# Patient Record
Sex: FEMALE | Race: BLACK OR AFRICAN AMERICAN | NOT HISPANIC OR LATINO | URBAN - METROPOLITAN AREA
[De-identification: names, ages, dates, MRNs, and addresses within clinical notes are randomized per-mention and may not be internally consistent; named-entity substitution may affect disease eponyms.]

---

## 2019-06-14 ENCOUNTER — EMERGENCY (EMERGENCY)
Facility: HOSPITAL | Age: 39
LOS: 1 days | Discharge: HOME | End: 2019-06-14
Attending: EMERGENCY MEDICINE | Admitting: EMERGENCY MEDICINE
Payer: COMMERCIAL

## 2019-06-14 VITALS
OXYGEN SATURATION: 97 % | DIASTOLIC BLOOD PRESSURE: 74 MMHG | SYSTOLIC BLOOD PRESSURE: 114 MMHG | WEIGHT: 160.06 LBS | HEART RATE: 89 BPM | RESPIRATION RATE: 18 BRPM | TEMPERATURE: 99 F

## 2019-06-14 DIAGNOSIS — R41.82 ALTERED MENTAL STATUS, UNSPECIFIED: ICD-10-CM

## 2019-06-14 DIAGNOSIS — R55 SYNCOPE AND COLLAPSE: ICD-10-CM

## 2019-06-14 DIAGNOSIS — F10.129 ALCOHOL ABUSE WITH INTOXICATION, UNSPECIFIED: ICD-10-CM

## 2019-06-14 LAB
ALBUMIN SERPL ELPH-MCNC: 4 G/DL — SIGNIFICANT CHANGE UP (ref 3.3–5)
ALP SERPL-CCNC: 78 U/L — SIGNIFICANT CHANGE UP (ref 40–120)
ALT FLD-CCNC: 11 U/L — SIGNIFICANT CHANGE UP (ref 10–45)
ANION GAP SERPL CALC-SCNC: 14 MMOL/L — SIGNIFICANT CHANGE UP (ref 5–17)
AST SERPL-CCNC: 16 U/L — SIGNIFICANT CHANGE UP (ref 10–40)
BASOPHILS # BLD AUTO: 0.01 K/UL — SIGNIFICANT CHANGE UP (ref 0–0.2)
BASOPHILS NFR BLD AUTO: 0.2 % — SIGNIFICANT CHANGE UP (ref 0–2)
BILIRUB SERPL-MCNC: <0.2 MG/DL — SIGNIFICANT CHANGE UP (ref 0.2–1.2)
BUN SERPL-MCNC: 12 MG/DL — SIGNIFICANT CHANGE UP (ref 7–23)
CALCIUM SERPL-MCNC: 8.8 MG/DL — SIGNIFICANT CHANGE UP (ref 8.4–10.5)
CHLORIDE SERPL-SCNC: 110 MMOL/L — HIGH (ref 96–108)
CO2 SERPL-SCNC: 20 MMOL/L — LOW (ref 22–31)
CREAT SERPL-MCNC: 0.83 MG/DL — SIGNIFICANT CHANGE UP (ref 0.5–1.3)
EOSINOPHIL # BLD AUTO: 0.09 K/UL — SIGNIFICANT CHANGE UP (ref 0–0.5)
EOSINOPHIL NFR BLD AUTO: 1.5 % — SIGNIFICANT CHANGE UP (ref 0–6)
ETHANOL SERPL-MCNC: 146 MG/DL — HIGH (ref 0–10)
GLUCOSE SERPL-MCNC: 133 MG/DL — HIGH (ref 70–99)
HCG SERPL-ACNC: <0 MIU/ML — SIGNIFICANT CHANGE UP
HCT VFR BLD CALC: 31.4 % — LOW (ref 34.5–45)
HGB BLD-MCNC: 10.1 G/DL — LOW (ref 11.5–15.5)
IMM GRANULOCYTES NFR BLD AUTO: 0.3 % — SIGNIFICANT CHANGE UP (ref 0–1.5)
LIDOCAIN IGE QN: 35 U/L — SIGNIFICANT CHANGE UP (ref 7–60)
LYMPHOCYTES # BLD AUTO: 2.49 K/UL — SIGNIFICANT CHANGE UP (ref 1–3.3)
LYMPHOCYTES # BLD AUTO: 40.3 % — SIGNIFICANT CHANGE UP (ref 13–44)
MCHC RBC-ENTMCNC: 30 PG — SIGNIFICANT CHANGE UP (ref 27–34)
MCHC RBC-ENTMCNC: 32.2 GM/DL — SIGNIFICANT CHANGE UP (ref 32–36)
MCV RBC AUTO: 93.2 FL — SIGNIFICANT CHANGE UP (ref 80–100)
MONOCYTES # BLD AUTO: 0.43 K/UL — SIGNIFICANT CHANGE UP (ref 0–0.9)
MONOCYTES NFR BLD AUTO: 7 % — SIGNIFICANT CHANGE UP (ref 2–14)
NEUTROPHILS # BLD AUTO: 3.14 K/UL — SIGNIFICANT CHANGE UP (ref 1.8–7.4)
NEUTROPHILS NFR BLD AUTO: 50.7 % — SIGNIFICANT CHANGE UP (ref 43–77)
NRBC # BLD: 0 /100 WBCS — SIGNIFICANT CHANGE UP (ref 0–0)
PCP SPEC-MCNC: SIGNIFICANT CHANGE UP
PLATELET # BLD AUTO: 344 K/UL — SIGNIFICANT CHANGE UP (ref 150–400)
POTASSIUM SERPL-MCNC: 3.3 MMOL/L — LOW (ref 3.5–5.3)
POTASSIUM SERPL-SCNC: 3.3 MMOL/L — LOW (ref 3.5–5.3)
PROT SERPL-MCNC: 6.9 G/DL — SIGNIFICANT CHANGE UP (ref 6–8.3)
RBC # BLD: 3.37 M/UL — LOW (ref 3.8–5.2)
RBC # FLD: 14.2 % — SIGNIFICANT CHANGE UP (ref 10.3–14.5)
SODIUM SERPL-SCNC: 144 MMOL/L — SIGNIFICANT CHANGE UP (ref 135–145)
WBC # BLD: 6.18 K/UL — SIGNIFICANT CHANGE UP (ref 3.8–10.5)
WBC # FLD AUTO: 6.18 K/UL — SIGNIFICANT CHANGE UP (ref 3.8–10.5)

## 2019-06-14 PROCEDURE — 85025 COMPLETE CBC W/AUTO DIFF WBC: CPT

## 2019-06-14 PROCEDURE — 83690 ASSAY OF LIPASE: CPT

## 2019-06-14 PROCEDURE — 99285 EMERGENCY DEPT VISIT HI MDM: CPT | Mod: 25

## 2019-06-14 PROCEDURE — 84702 CHORIONIC GONADOTROPIN TEST: CPT

## 2019-06-14 PROCEDURE — 70450 CT HEAD/BRAIN W/O DYE: CPT

## 2019-06-14 PROCEDURE — 80307 DRUG TEST PRSMV CHEM ANLYZR: CPT

## 2019-06-14 PROCEDURE — 70450 CT HEAD/BRAIN W/O DYE: CPT | Mod: 26

## 2019-06-14 PROCEDURE — 36415 COLL VENOUS BLD VENIPUNCTURE: CPT

## 2019-06-14 PROCEDURE — 80053 COMPREHEN METABOLIC PANEL: CPT

## 2019-06-14 PROCEDURE — 99285 EMERGENCY DEPT VISIT HI MDM: CPT

## 2019-06-14 PROCEDURE — 96374 THER/PROPH/DIAG INJ IV PUSH: CPT

## 2019-06-14 PROCEDURE — 96375 TX/PRO/DX INJ NEW DRUG ADDON: CPT

## 2019-06-14 PROCEDURE — 96361 HYDRATE IV INFUSION ADD-ON: CPT

## 2019-06-14 PROCEDURE — 82962 GLUCOSE BLOOD TEST: CPT

## 2019-06-14 RX ORDER — SODIUM CHLORIDE 9 MG/ML
1000 INJECTION INTRAMUSCULAR; INTRAVENOUS; SUBCUTANEOUS ONCE
Refills: 0 | Status: COMPLETED | OUTPATIENT
Start: 2019-06-14 | End: 2019-06-14

## 2019-06-14 RX ORDER — ONDANSETRON 8 MG/1
4 TABLET, FILM COATED ORAL ONCE
Refills: 0 | Status: COMPLETED | OUTPATIENT
Start: 2019-06-14 | End: 2019-06-14

## 2019-06-14 RX ORDER — HALOPERIDOL DECANOATE 100 MG/ML
0.5 INJECTION INTRAMUSCULAR ONCE
Refills: 0 | Status: COMPLETED | OUTPATIENT
Start: 2019-06-14 | End: 2019-06-14

## 2019-06-14 RX ADMIN — ONDANSETRON 4 MILLIGRAM(S): 8 TABLET, FILM COATED ORAL at 21:41

## 2019-06-14 RX ADMIN — Medication 1 MILLIGRAM(S): at 22:04

## 2019-06-14 RX ADMIN — SODIUM CHLORIDE 2000 MILLILITER(S): 9 INJECTION INTRAMUSCULAR; INTRAVENOUS; SUBCUTANEOUS at 21:41

## 2019-06-14 NOTE — ED ADULT NURSE NOTE - NSIMPLEMENTINTERV_GEN_ALL_ED
Implemented All Fall with Harm Risk Interventions:  [place] to call system. Call bell, personal items and telephone within reach. Instruct patient to call for assistance. Room bathroom lighting operational. Non-slip footwear when patient is off stretcher. Physically safe environment: no spills, clutter or unnecessary equipment. Stretcher in lowest position, wheels locked, appropriate side rails in place. Provide visual cue, wrist band, yellow gown, etc. Monitor gait and stability. Monitor for mental status changes and reorient to person, place, and time. Review medications for side effects contributing to fall risk. Reinforce activity limits and safety measures with patient and family. Provide visual clues: red socks.

## 2019-06-14 NOTE — ED ADULT NURSE REASSESSMENT NOTE - NS ED NURSE REASSESS COMMENT FT1
Patient vomited x 2 prior to CT, zofran administered with minimal effect. Ativan administered with good effect. No vomiting since. Patient currently sleeping comfortably in bed, in L side lying position. No s/s of respiratory distress. Lethargic but arousable to touch.

## 2019-06-14 NOTE — ED PROVIDER NOTE - NSFOLLOWUPINSTRUCTIONS_ED_ALL_ED_FT
I have discussed the discharge plan with the patient. The patient agrees with the plan, as discussed.  The patient understands Emergency Department diagnosis is a preliminary diagnosis often based on limited information and that the patient must adhere to the follow-up plan as discussed.  The patient understands that if the symptoms worsen or if prescribed medications do not have the desired/planned effect that the patient may return to the Emergency Department at any time for further evaluation and treatment.      Alcohol Intoxication  Alcohol intoxication occurs when a person no longer thinks clearly or functions well (becomes impaired) after drinking alcohol. Intoxication can occur with just one drink. The legal definition of alcohol intoxication depends on the amount of alcohol in the blood (blood alcohol concentration, EFREN). EFREN of 80–100 mg/dL or higher is commonly considered legally intoxicated. The level of impairment depends on:  The amount of alcohol the person had.  The person's age, gender, and weight.  How often the person drinks.  Whether the person has other medical conditions, such as diabetes, seizures, or a heart condition.  Alcohol intoxication can range from mild to severe. The condition can be dangerous, especially if the person:  Also took certain drugs or prescription medicines.  Drinks a large amount of alcohol in a short period of time (binge-drinks).  For women, binge-drinking is having four or more drinks at one time.  For men, binge-drinking is having five or more drinks at one time.  What are the causes?  This condition is caused by drinking alcohol.    What are the signs or symptoms?  Alcohol affects everyone differently. Symptoms of alcohol intoxication can vary among people.    Symptoms of mild alcohol intoxication may include:  Feeling relaxed or sleepy.  Having mild difficulty with coordination, speech, memory, or attention.  Symptoms of moderate alcohol intoxication may include:  Extreme emotions, like anger or sadness.  Moderate difficulty with coordination, speech, memory, or attention.  Symptoms of severe alcohol intoxication may include:  Severe difficulty with coordination, speech, memory, or attention.  Passing out.  Vomiting.  Confusion.  Slow breathing.  Coma.  Intoxication can change quickly from mild to severe. It can cause coma or death, especially in people who are not exposed to alcohol often.    How is this diagnosed?  Your health care provider will ask you how much alcohol you drank and what kind you had. Intoxication may also be diagnosed based on:  Your symptoms and medical history.  A physical exam.  A blood test that measures EFREN.  Whether there is a smell of alcohol on your breath.  How is this treated?  Treatment for alcohol intoxication may include:  Being monitored in an emergency department, hospital, or treatment center until your blood alcohol level starts to lower and it is safe for you to go home.  IV fluids to prevent or treat dehydration.  Medicine to treat nausea or vomiting.  Medicine to help your body get rid of the alcohol.  A short counseling session (brief intervention) about the dangers of using alcohol excessively.  Working with (referral to) a counselor or specialist for treatment of substance use disorder.   Substance use disorder is a condition in which repeated use of drugs or alcohol causes problems with mental and physical health, affects relationships, or affects one's ability to meet responsibilities at work, home, or school.  Oxygen therapy or a breathing machine (ventilator), if you are not breathing adequately.  Long-term (chronic) exposure to alcohol can have long-term effects on your brain, heart, and gastrointestinal system. These effects can be serious and may also require treatment.    Follow these instructions at home:  Image   Eating and drinking     Image   Ask your health care provider if alcohol is safe for you.  If your health care provider says that alcohol is safe for you, limit alcohol intake to no more than 1 drink a day for nonpregnant women and 2 drinks a day for men. One drink equals 12 oz of beer, 5 oz of wine, or 1½ oz of hard liquor.  Do not drink alcohol if:  You are under the legal drinking age (21 years in the [place]  You are pregnant or may be pregnant.  You are taking medicines that should not be taken with alcohol.  Alcohol causes your medical condition to get worse.  You need to drive or perform activities that require your attention.  You have substance use disorder.  Avoid drinking alcohol on an empty stomach.  Stay hydrated. Drink enough fluid to keep your urine pale yellow. Avoid caffeine because it can dehydrate you.  Avoid drinking more than one drink per hour.  When having multiple drinks, drink water or a non-alcoholic beverage between alcoholic drinks.  General instructions     Do not drive after drinking any amount of alcohol. Plan for a designated  or another way to go home.  Have someone responsible stay with you while you are intoxicated. You should not be left alone.  Take over-the-counter and prescription medicines only as told by your health care provider.  Contact a health care provider if:  You do not feel better after a few days.  You have problems at work, at school, or at home due to drinking.  Get help right away if:  You have any of the following:  Moderate to severe trouble with coordination, speech, memory, or attention.  Trouble staying awake.  Severe confusion.  A seizure.  Light-headedness.  Fainting.  Vomiting bright red blood or material that looks like coffee grounds.  Bloody stool (feces). The blood may be bright red, or it may make stool look black and tarry and make it smell bad.  Shakiness when trying to stop drinking.  Thoughts about hurting yourself or others.  If you or anyone around you appears intoxicated, speak up and act.    If you ever feel like you may hurt yourself or others, or have thoughts about taking your own life, get help right away. You can go to your nearest emergency department or call:   Your local emergency services (911 in the [place]   A suicide crisis helpline, such as the National Suicide Prevention Lifeline at 1-942.898.3379. This is open 24 hours a day.   Summary  Alcohol intoxication occurs when a person no longer thinks clearly or functions well (becomes impaired) after drinking alcohol.  If your health care provider says that alcohol is safe for you, limit alcohol intake to no more than 1 drink a day for women (no drinks if you are pregnant) and 2 drinks a day for men. One drink equals 12 oz of beer, 5 oz of wine, or 1½ oz of hard liquor.  Contact your health care provider if you are having problems at work, at school, or at home due to drinking.   Get help right away if you have thoughts about hurting yourself or others.  This information is not intended to replace advice given to you by your health care provider. Make sure you discuss any questions you have with your health care provider.

## 2019-06-14 NOTE — ED ADULT NURSE NOTE - CHIEF COMPLAINT QUOTE
ams due to etoh, had 4 glasses of mix drinks on a senior living party of her manager, pass out and hit her head on the wall witnessed  by her co worker about 30 minutes ago, vomited 2 times prior to syncope

## 2019-06-14 NOTE — ED PROVIDER NOTE - CLINICAL SUMMARY MEDICAL DECISION MAKING FREE TEXT BOX
40yo female, intoxicated, BIBA after drinking at a party tonight. Pt is unable to provide history herself 2/2 intoxication. History provided per coworkers. Pt was vomiting PTA and while in ER. Coworkers also note that pt had episode of fall/possible syncope during which she hit her head against a wall PTA. Head CT done with no acute findings. Plan: IV hydration, antiemetics, observe pending sobriety. Reeval. d/c home when stable with steady gait.

## 2019-06-14 NOTE — ED ADULT TRIAGE NOTE - CHIEF COMPLAINT QUOTE
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

## 2019-06-14 NOTE — ED PROVIDER NOTE - OBJECTIVE STATEMENT
40 yo female BIBA after she had witnessed episode of syncope? as per co-workers, pt had a few drinks tonight as they were all together at the party, pt was c/o not feeling well. 40 yo female BIBA after she had witnessed episode of syncope? as per co-workers, pt had a few drinks tonight as they were all together at the party, pt was c/o not feeling well, vomit x 2 and than fainted. Pt also hit her head in the wall when fainted. on the rout to ER pt vomit again and continue vomiting while in the ER. No other signs of injury noted. 38 yo female, intoxicated, BIBA, pt had a few drinks at a party tonight. As per coworkers, pt was c/o not feeling well, vomit x 2 and then fainted. Pt also hit her head on the wall when she fainted. on the route to the ER pt vomited again and continued vomiting while in the ER. No other signs of injury noted. Pt is poor historian, AOB positive.

## 2019-06-14 NOTE — ED ADULT NURSE NOTE - OBJECTIVE STATEMENT
Patient brought in by EMS accompanied by colleague/family. Per report, patient was at a party had some drinks, and may have syncopized. Report to may have hit head when LOC as well. Upon arrival, patient lethargic, arousable by painful stimuli. PERRL, unable to follow commands. No SOB, breathing spontaneously. Vomiting undigested food x 1.

## 2019-06-15 VITALS
DIASTOLIC BLOOD PRESSURE: 74 MMHG | TEMPERATURE: 97 F | RESPIRATION RATE: 16 BRPM | OXYGEN SATURATION: 100 % | SYSTOLIC BLOOD PRESSURE: 118 MMHG | HEART RATE: 74 BPM

## 2019-06-15 RX ORDER — SODIUM CHLORIDE 9 MG/ML
1000 INJECTION INTRAMUSCULAR; INTRAVENOUS; SUBCUTANEOUS ONCE
Refills: 0 | Status: DISCONTINUED | OUTPATIENT
Start: 2019-06-15 | End: 2019-06-18

## 2019-06-15 RX ADMIN — SODIUM CHLORIDE 1000 MILLILITER(S): 9 INJECTION INTRAMUSCULAR; INTRAVENOUS; SUBCUTANEOUS at 00:06

## 2019-06-15 NOTE — ED ADULT NURSE REASSESSMENT NOTE - NS ED NURSE REASSESS COMMENT FT1
Patient arousable, responds to name and able to ambulate with assistance to bathroom. Continue to monitor.

## 2019-06-15 NOTE — ED ADULT NURSE REASSESSMENT NOTE - NS ED NURSE REASSESS COMMENT FT1
Patient A+O x 4, ambulatory. States feeling much better, following commands and answering questions appropriately. Requesting to be d/c home. Seen by PA and cleared for d/c home.

## 2022-07-15 NOTE — ED ADULT NURSE NOTE - NS ED NURSE LEVEL OF CONSCIOUSNESS SPEECH
Speaking Coherently
You can access the FollowMyHealth Patient Portal offered by [facility] by registering at the following website: [email or web address]. By joining [facility] FollowMyHealth portal, you will also be able to view your health information using other applications (apps) compatible with our system.

## 2025-04-03 NOTE — ED ADULT NURSE NOTE - CAS DISCH TRANSFER METHOD
Detail Level: Detailed Quality 226: Preventive Care And Screening: Tobacco Use: Screening And Cessation Intervention: Patient screened for tobacco use and is an ex/non-smoker Walking